# Patient Record
Sex: FEMALE | Race: ASIAN | NOT HISPANIC OR LATINO
[De-identification: names, ages, dates, MRNs, and addresses within clinical notes are randomized per-mention and may not be internally consistent; named-entity substitution may affect disease eponyms.]

---

## 2019-09-25 ENCOUNTER — APPOINTMENT (OUTPATIENT)
Dept: CARDIOLOGY | Facility: CLINIC | Age: 49
End: 2019-09-25
Payer: MEDICAID

## 2019-09-25 VITALS
BODY MASS INDEX: 28.33 KG/M2 | DIASTOLIC BLOOD PRESSURE: 96 MMHG | OXYGEN SATURATION: 96 % | TEMPERATURE: 98.2 F | RESPIRATION RATE: 17 BRPM | SYSTOLIC BLOOD PRESSURE: 145 MMHG | HEART RATE: 65 BPM | WEIGHT: 168 LBS | HEIGHT: 64.57 IN

## 2019-09-25 DIAGNOSIS — R73.9 HYPERGLYCEMIA, UNSPECIFIED: ICD-10-CM

## 2019-09-25 DIAGNOSIS — Z72.0 TOBACCO USE: ICD-10-CM

## 2019-09-25 DIAGNOSIS — R00.2 PALPITATIONS: ICD-10-CM

## 2019-09-25 DIAGNOSIS — Z78.9 OTHER SPECIFIED HEALTH STATUS: ICD-10-CM

## 2019-09-25 PROBLEM — Z00.00 ENCOUNTER FOR PREVENTIVE HEALTH EXAMINATION: Status: ACTIVE | Noted: 2019-09-25

## 2019-09-25 PROCEDURE — 93306 TTE W/DOPPLER COMPLETE: CPT

## 2019-09-25 PROCEDURE — 99204 OFFICE O/P NEW MOD 45 MIN: CPT

## 2019-09-25 RX ORDER — ERGOCALCIFEROL 1.25 MG/1
1.25 MG CAPSULE, LIQUID FILLED ORAL
Qty: 4 | Refills: 0 | Status: ACTIVE | COMMUNITY
Start: 2019-04-17

## 2019-09-25 RX ORDER — CALCIUM CARBONATE/VITAMIN D3 600 MG-10
600-400 TABLET ORAL
Qty: 60 | Refills: 0 | Status: ACTIVE | COMMUNITY
Start: 2019-04-17

## 2019-09-25 RX ORDER — VITAMIN B COMPLEX
TABLET ORAL
Refills: 0 | Status: ACTIVE | COMMUNITY

## 2019-09-25 NOTE — DISCUSSION/SUMMARY
[FreeTextEntry1] : 49 year-old female with hyperglycemia presents for evaluation of palpitations.  Patient reports that for the past 2 weeks she has been experiencing skipped heartbeats lasting seconds.  She also had similar symptoms years ago when she was pregnant.  Patient denies CP or SOB.  Patient denies h/o syncope.  She drinks one large cup of coffee or tea a day.\par \par (1) Palpitations - Her symptom is suggestive of isolated APC's or PVC's.  I advised patient to undergo an echocardiogram.  I advised patient to wear a Holter monitor. \par \par (2) Followup -

## 2019-09-25 NOTE — PHYSICAL EXAM
[General Appearance - Well Developed] : well developed [Normal Appearance] : normal appearance [Well Groomed] : well groomed [General Appearance - Well Nourished] : well nourished [No Deformities] : no deformities [General Appearance - In No Acute Distress] : no acute distress [Normal Conjunctiva] : the conjunctiva exhibited no abnormalities [Eyelids - No Xanthelasma] : the eyelids demonstrated no xanthelasmas [Normal Oral Mucosa] : normal oral mucosa [No Oral Pallor] : no oral pallor [No Oral Cyanosis] : no oral cyanosis [Normal Jugular Venous A Waves Present] : normal jugular venous A waves present [Normal Jugular Venous V Waves Present] : normal jugular venous V waves present [No Jugular Venous Frank A Waves] : no jugular venous frank A waves [Heart Rate And Rhythm] : heart rate and rhythm were normal [Heart Sounds] : normal S1 and S2 [Murmurs] : no murmurs present [Arterial Pulses Normal] : the arterial pulses were normal [Edema] : no peripheral edema present [Respiration, Rhythm And Depth] : normal respiratory rhythm and effort [Exaggerated Use Of Accessory Muscles For Inspiration] : no accessory muscle use [Auscultation Breath Sounds / Voice Sounds] : lungs were clear to auscultation bilaterally [Abdomen Soft] : soft [Abdomen Tenderness] : non-tender [Abdomen Mass (___ Cm)] : no abdominal mass palpated [Abnormal Walk] : normal gait [Gait - Sufficient For Exercise Testing] : the gait was sufficient for exercise testing [Nail Clubbing] : no clubbing of the fingernails [Cyanosis, Localized] : no localized cyanosis [Petechial Hemorrhages (___cm)] : no petechial hemorrhages [] : no ischemic changes [Oriented To Time, Place, And Person] : oriented to person, place, and time [Affect] : the affect was normal [Mood] : the mood was normal [No Anxiety] : not feeling anxious

## 2019-09-25 NOTE — HISTORY OF PRESENT ILLNESS
[FreeTextEntry1] : 49 year-old female with hyperglycemia presents for evaluation of palpitations.  Patient reports that for the past 2 weeks she has been experiencing skipped heartbeats lasting seconds.  She also had similar symptoms years ago when she was pregnant.  Patient denies CP or SOB.  Patient denies h/o syncope.  She drinks one large cup of coffee or tea a day.

## 2019-10-03 ENCOUNTER — NON-APPOINTMENT (OUTPATIENT)
Age: 49
End: 2019-10-03

## 2019-10-04 ENCOUNTER — RESULT REVIEW (OUTPATIENT)
Age: 49
End: 2019-10-04

## 2020-11-24 NOTE — PHYSICAL EXAM
[General Appearance - Well Developed] : well developed [Normal Appearance] : normal appearance [Well Groomed] : well groomed [General Appearance - Well Nourished] : well nourished [No Deformities] : no deformities [General Appearance - In No Acute Distress] : no acute distress [Normal Conjunctiva] : the conjunctiva exhibited no abnormalities [Eyelids - No Xanthelasma] : the eyelids demonstrated no xanthelasmas [Normal Oral Mucosa] : normal oral mucosa [No Oral Pallor] : no oral pallor [No Oral Cyanosis] : no oral cyanosis [Normal Jugular Venous A Waves Present] : normal jugular venous A waves present [Normal Jugular Venous V Waves Present] : normal jugular venous V waves present [No Jugular Venous Frank A Waves] : no jugular venous frank A waves [Respiration, Rhythm And Depth] : normal respiratory rhythm and effort [Exaggerated Use Of Accessory Muscles For Inspiration] : no accessory muscle use [Auscultation Breath Sounds / Voice Sounds] : lungs were clear to auscultation bilaterally [Heart Rate And Rhythm] : heart rate and rhythm were normal [Heart Sounds] : normal S1 and S2 [Murmurs] : no murmurs present [Arterial Pulses Normal] : the arterial pulses were normal [Edema] : no peripheral edema present [Abdomen Soft] : soft [Abdomen Tenderness] : non-tender [Abdomen Mass (___ Cm)] : no abdominal mass palpated [Abnormal Walk] : normal gait [Gait - Sufficient For Exercise Testing] : the gait was sufficient for exercise testing [Nail Clubbing] : no clubbing of the fingernails [Cyanosis, Localized] : no localized cyanosis [Petechial Hemorrhages (___cm)] : no petechial hemorrhages [] : no ischemic changes [Oriented To Time, Place, And Person] : oriented to person, place, and time [Affect] : the affect was normal [Mood] : the mood was normal [No Anxiety] : not feeling anxious

## 2020-11-25 ENCOUNTER — APPOINTMENT (OUTPATIENT)
Dept: CARDIOLOGY | Facility: CLINIC | Age: 50
End: 2020-11-25
Payer: MEDICAID

## 2020-11-25 ENCOUNTER — NON-APPOINTMENT (OUTPATIENT)
Age: 50
End: 2020-11-25

## 2020-11-25 VITALS
DIASTOLIC BLOOD PRESSURE: 82 MMHG | BODY MASS INDEX: 28.5 KG/M2 | WEIGHT: 169 LBS | RESPIRATION RATE: 17 BRPM | OXYGEN SATURATION: 96 % | HEART RATE: 75 BPM | TEMPERATURE: 97.7 F | SYSTOLIC BLOOD PRESSURE: 124 MMHG

## 2020-11-25 DIAGNOSIS — R42 DIZZINESS AND GIDDINESS: ICD-10-CM

## 2020-11-25 PROCEDURE — 93000 ELECTROCARDIOGRAM COMPLETE: CPT

## 2020-11-25 PROCEDURE — 99213 OFFICE O/P EST LOW 20 MIN: CPT

## 2020-11-26 PROBLEM — R42 DIZZINESS: Status: ACTIVE | Noted: 2020-11-26

## 2020-12-07 NOTE — HISTORY OF PRESENT ILLNESS
[FreeTextEntry1] : 50 year-old female with hyperglycemia presents for followup.  \par \par Patient was last seen on 9/25/19 for evaluation of palpitations.  Patient underwent an echocardiogram and it showed normal LV function without significant valvular pathology.  Patient wore a Holter and it showed rare APC's, rare PVC's, without significant arrhythmia. \par

## 2020-12-07 NOTE — REASON FOR VISIT
[Follow-Up - Clinic] : a clinic follow-up of [Palpitations] : palpitations [FreeTextEntry1] : 11/25/20- Patient reports feeling dizzy, spinning for 3 months since adding second diabetes medication. She was also started on Aspirin. Patient denies CP, SOB or palpitations. I advised patient to hold Aspirin for few weeks and see if it helps her dizziness and also check with PCP to switch to different diabetes medication.

## 2022-03-01 ENCOUNTER — APPOINTMENT (OUTPATIENT)
Dept: CARDIOLOGY | Facility: CLINIC | Age: 52
End: 2022-03-01
Payer: MEDICAID

## 2022-03-01 VITALS
HEART RATE: 60 BPM | TEMPERATURE: 97.9 F | SYSTOLIC BLOOD PRESSURE: 133 MMHG | DIASTOLIC BLOOD PRESSURE: 83 MMHG | WEIGHT: 164 LBS | OXYGEN SATURATION: 96 % | RESPIRATION RATE: 18 BRPM | BODY MASS INDEX: 27.66 KG/M2

## 2022-03-01 PROCEDURE — 99214 OFFICE O/P EST MOD 30 MIN: CPT | Mod: 25

## 2022-03-01 PROCEDURE — 93306 TTE W/DOPPLER COMPLETE: CPT

## 2022-03-01 PROCEDURE — 93015 CV STRESS TEST SUPVJ I&R: CPT

## 2022-03-01 PROCEDURE — 99072 ADDL SUPL MATRL&STAF TM PHE: CPT

## 2022-03-12 NOTE — HISTORY OF PRESENT ILLNESS
[FreeTextEntry1] : 3/1/22 -  Patient reports a month ago she had an episode of L sided CP that feels like a tearing lasting seconds, that is not tender, not from exertion, non-pleuritic . Patient denies SOB. Patient denies palpitations. Patient had no recent CXR. I advised patient to undergo an echocardiogram and a treadmill stress test. \par \par 11/25/20- Patient reports feeling dizzy, spinning for 3 months since adding second diabetes medication. She was also started on Aspirin. Patient denies CP, SOB or palpitations. I advised patient to hold Aspirin for few weeks and see if it helps her dizziness and also check with PCP to switch to different diabetes medication.

## 2022-03-12 NOTE — REASON FOR VISIT
[FreeTextEntry1] : 51 year-old female with hyperglycemia presents for followup.  \par \par Patient was last seen on 11/25/20 for feeling  dizzy, spinning for 3 months since adding second diabetes medication. \par \par Patient underwent an echocardiogram 9/25/19 and it showed normal LV function without significant valvular pathology.  \par \par Patient wore a Holter 9/25/19 and it showed rare APC's, rare PVC's, without significant arrhythmia.

## 2024-06-04 ENCOUNTER — APPOINTMENT (OUTPATIENT)
Dept: CARDIOLOGY | Facility: CLINIC | Age: 54
End: 2024-06-04
Payer: COMMERCIAL

## 2024-06-04 VITALS
RESPIRATION RATE: 18 BRPM | OXYGEN SATURATION: 95 % | HEART RATE: 59 BPM | TEMPERATURE: 98.3 F | DIASTOLIC BLOOD PRESSURE: 79 MMHG | BODY MASS INDEX: 27.99 KG/M2 | WEIGHT: 166 LBS | SYSTOLIC BLOOD PRESSURE: 130 MMHG

## 2024-06-04 DIAGNOSIS — R07.89 OTHER CHEST PAIN: ICD-10-CM

## 2024-06-04 PROCEDURE — 93015 CV STRESS TEST SUPVJ I&R: CPT

## 2024-06-04 PROCEDURE — 99214 OFFICE O/P EST MOD 30 MIN: CPT | Mod: 25

## 2024-06-04 PROCEDURE — 93306 TTE W/DOPPLER COMPLETE: CPT

## 2024-06-04 NOTE — HISTORY OF PRESENT ILLNESS
[FreeTextEntry1] : 6/4/24- Patient reports that for weeks she has L upper CP lasting seconds that is sharp and non-tender to touch, not related to exertion. Patient denies SOB. Patient denies palpitations. Patient denies belching. I advised patient to undergo an echocardiogram and a treadmill stress test.   3/1/22 -  Patient reports a month ago she had an episode of L sided CP that feels like a tearing lasting seconds, that is not tender, not from exertion, non-pleuritic . Patient denies SOB. Patient denies palpitations. Patient had no recent CXR. I advised patient to undergo an echocardiogram and a treadmill stress test.  Patient underwent an echocardiogram and it showed normal LV function without significant valvular pathology. Patient underwent a treadmill stress test and completed 9 minutes of Clem protocol.  There were downsloping ST depressions on ECG but no symptoms.  Following treadmill stress, there was no echocardiographic evidence of ischemia.   11/25/20- Patient reports feeling dizzy, spinning for 3 months since adding second diabetes medication. She was also started on Aspirin. Patient denies CP, SOB or palpitations. I advised patient to hold Aspirin for few weeks and see if it helps her dizziness and also check with PCP to switch to different diabetes medication.

## 2024-06-04 NOTE — REASON FOR VISIT
[FreeTextEntry1] : 54 year-old female with hyperglycemia presents for followup.    Patient was last seen on 3/1/22 -  Patient reports a month ago she had an episode of L sided CP that feels like a tearing lasting seconds, that is not tender, not from exertion, non-pleuritic . Patient denies SOB. Patient denies palpitations. Patient had no recent CXR. I advised patient to undergo an echocardiogram and a treadmill stress test.  Patient underwent an echocardiogram and it showed normal LV function without significant valvular pathology. Patient underwent a treadmill stress test and completed 9 minutes of Clem protocol.  There were downsloping ST depressions on ECG but no symptoms.  Following treadmill stress, there was no echocardiographic evidence of ischemia.   Patient underwent an echocardiogram 9/25/19 and it showed normal LV function without significant valvular pathology.    Patient wore a Holter 9/25/19 and it showed rare APC's, rare PVC's, without significant arrhythmia.